# Patient Record
Sex: MALE | Race: WHITE | NOT HISPANIC OR LATINO | URBAN - METROPOLITAN AREA
[De-identification: names, ages, dates, MRNs, and addresses within clinical notes are randomized per-mention and may not be internally consistent; named-entity substitution may affect disease eponyms.]

---

## 2017-12-16 ENCOUNTER — EMERGENCY (EMERGENCY)
Facility: HOSPITAL | Age: 62
LOS: 1 days | Discharge: HOME | End: 2017-12-16
Attending: EMERGENCY MEDICINE | Admitting: EMERGENCY MEDICINE
Payer: COMMERCIAL

## 2017-12-16 VITALS
HEIGHT: 72 IN | SYSTOLIC BLOOD PRESSURE: 137 MMHG | HEART RATE: 85 BPM | TEMPERATURE: 98 F | RESPIRATION RATE: 16 BRPM | OXYGEN SATURATION: 98 % | DIASTOLIC BLOOD PRESSURE: 99 MMHG | WEIGHT: 175.05 LBS

## 2017-12-16 PROCEDURE — 99283 EMERGENCY DEPT VISIT LOW MDM: CPT | Mod: 25

## 2017-12-16 PROCEDURE — 90715 TDAP VACCINE 7 YRS/> IM: CPT

## 2017-12-16 PROCEDURE — 99284 EMERGENCY DEPT VISIT MOD MDM: CPT

## 2017-12-16 PROCEDURE — 90471 IMMUNIZATION ADMIN: CPT

## 2017-12-16 RX ORDER — RALTEGRAVIR 400 MG/1
0 TABLET, FILM COATED ORAL
Qty: 0 | Refills: 0 | COMMUNITY

## 2017-12-16 RX ORDER — TETANUS TOXOID, REDUCED DIPHTHERIA TOXOID AND ACELLULAR PERTUSSIS VACCINE, ADSORBED 5; 2.5; 8; 8; 2.5 [IU]/.5ML; [IU]/.5ML; UG/.5ML; UG/.5ML; UG/.5ML
0.5 SUSPENSION INTRAMUSCULAR ONCE
Qty: 0 | Refills: 0 | Status: COMPLETED | OUTPATIENT
Start: 2017-12-16 | End: 2017-12-16

## 2017-12-16 RX ORDER — IBUPROFEN 200 MG
400 TABLET ORAL ONCE
Qty: 0 | Refills: 0 | Status: DISCONTINUED | OUTPATIENT
Start: 2017-12-16 | End: 2017-12-16

## 2017-12-16 RX ORDER — HUMAN RABIES VIRUS IMMUNE GLOBULIN 150 [IU]/ML
1600 INJECTION, SOLUTION INTRAMUSCULAR ONCE
Qty: 0 | Refills: 0 | Status: DISCONTINUED | OUTPATIENT
Start: 2017-12-16 | End: 2017-12-20

## 2017-12-16 RX ORDER — IBUPROFEN 200 MG
600 TABLET ORAL ONCE
Qty: 0 | Refills: 0 | Status: COMPLETED | OUTPATIENT
Start: 2017-12-16 | End: 2017-12-16

## 2017-12-16 RX ORDER — EMTRICITABINE AND TENOFOVIR DISOPROXIL FUMARATE 200; 300 MG/1; MG/1
0 TABLET, FILM COATED ORAL
Qty: 0 | Refills: 0 | COMMUNITY

## 2017-12-16 RX ORDER — RABIES VACC, HUMAN DIPLOID/PF 2.5 UNIT
1 VIAL (EA) INTRAMUSCULAR ONCE
Qty: 0 | Refills: 0 | Status: DISCONTINUED | OUTPATIENT
Start: 2017-12-16 | End: 2017-12-20

## 2017-12-16 RX ORDER — ETRAVIRINE 200 MG/1
0 TABLET ORAL
Qty: 0 | Refills: 0 | COMMUNITY

## 2017-12-16 RX ADMIN — Medication 600 MILLIGRAM(S): at 14:40

## 2017-12-16 RX ADMIN — TETANUS TOXOID, REDUCED DIPHTHERIA TOXOID AND ACELLULAR PERTUSSIS VACCINE, ADSORBED 0.5 MILLILITER(S): 5; 2.5; 8; 8; 2.5 SUSPENSION INTRAMUSCULAR at 14:38

## 2017-12-16 RX ADMIN — Medication 1 TABLET(S): at 14:39

## 2017-12-16 NOTE — ED PROVIDER NOTE - OBJECTIVE STATEMENT
63 yo M w/ pmh HIV (last CD4 600, well managed w/ meds) p/w dog bite to legs. 12:45pm today pt was walking outside, saw neighbor's dog (believes usu is aggressive, recent rescue? unk vaccination hx) straining against the owner's leash, got loose and bit him on his legs, once on left low posterior thigh, latched onto right upper posterior calf. Reports mild pain in bite sites 3/10, did not take pain meds, has not taken antibiotics. Ambulating w/out difficulty, denies pain elsewhere other than two bite sites; denies f/n/v, sob, myalgias. Pt has been unable to contact the owner of the dog, contacted apartment superintendent to get in contact w/ owner of dog but was unsuccessful.

## 2017-12-16 NOTE — ED PROVIDER NOTE - ATTENDING CONTRIBUTION TO CARE
61yo M with h/o HIV (last CD4 600+, last viral load undetectable), presenting with dog bite to both legs -   is from known dog with + vaccinations for rabies and able to be observed (no indication for rabies vaccination/ig).  Reported to [facility] JAYLAN given location of incident.   No laceration repairs needed.  Wound care/wash out performed and augmentin given for infection prophylaxis.  Stable for dc.

## 2017-12-16 NOTE — ED PROVIDER NOTE - PROGRESS NOTE DETAILS
Dr. Tate Allison PGY1: Dr. Samaniego [facility], awaiting callback w/ rec for rabies prophylaxis or not Dr. Tate Allison PGY1: pt reports received call back from the owner of the dog who bit him, "Darya", I spoke w/ her and she said she got it from a shelter in the city, holding it while waiting for family to take; reports dog is fully vaccinated and up to date and will send the patient a picture of the records Dr. Tate Allison PGY1: pt reports received call back from the owner of the dog who bit him, "Darya", I spoke w/ her and she said she got it from a shelter in the city, holding it while waiting for family to take; reports dog is fully vaccinated and up to date and will send the patient a picture of the records; owner of the dog says that she was not present during the incident and thinks that a family member may have been walking the dog Spoke with Dr Pickett ([facility] as bite occurred in [place]), dog is vaccinated per owner, known dog in same apartment building; will defer rabies ig/vaccination at present.  Decision making and risks/benefits of rabies vaccination d/w patient; based on available information and shared decision making will defer any vaccination at present.  Patient stable for dc on augmentin, to f/u with [facility] and his PCP.   -Aden

## 2017-12-16 NOTE — ED PROVIDER NOTE - MEDICAL DECISION MAKING DETAILS
63 yo M w/ unprovoked dog bites, unable to get in contact w/ dog owner; no constitutional findings, no neurovasculomucuslar findings, not indicated for lac repair, provide augmentin; also rabies IVIG and vaccine pending dept of health callback 61 yo M w/ unprovoked dog bites, unable to get in contact w/ dog owner; no constitutional findings, no neurovasculomucuslar findings, not indicated for lac repair, provide augmentin; also rabies IVIG and vaccine pending dept of health callback    -Addendum: 61 yo M with h/o HIV+ presenting with b/l LE dog bites; now is from known dog with + vaccinations for rabies and able to be observed.  No laceration repairs needed.  Wound care/wash out performed and augmentin given for infection prophylaxis.  Stable for dc.

## 2017-12-16 NOTE — ED ADULT NURSE REASSESSMENT NOTE - NS ED NURSE REASSESS COMMENT FT1
owner of dog contacted patient. immunizations are complete . dog is neg for rabies. pt ckeared for discharge.. b/l thigh wounds cleansed bacitracin ointment applied with DSD

## 2017-12-16 NOTE — ED PROVIDER NOTE - PHYSICAL EXAMINATION
*GEN:   comfortable, in no acute distress, AOx3    ///    *EYES:   pupils equally round and reactive to light, extra-occular movements intact    ///    *HEENT:   airway patent, moist mucosal membranes    ///    *CV:   regular rate and rhythm, normal S1/S2    ///    *RESP:   clear to auscultation bilaterally, non-labored    ///    *ABD:   soft, non-tender    ///    *:   no cva/flank tenderness    ///    *MSK:   no MSK tenderness or limited ROM    ///    *SKIN:   L distal posterior thigh 2x2cm area of non-bleeding skin abrasions, R proximal posterior calf 4x4cm area of non-bleeding skin abrasions    ///    *NEURO:   AOx3, no focal weakness or loss of sensation, gait normal L distal posterior thigh 2x2cm area of non-bleeding skin abrasions in semi [place] (bite maryanne), R proximal posterior calf 4x4cm area of non-bleeding skin abrasions

## 2017-12-16 NOTE — ED ADULT NURSE NOTE - OBJECTIVE STATEMENT
pt ambulatory to ft 1 c/o dog bite to back of both legs. dog was on leash . unknown whether dog is utd with immunizations. dog is rescue and owners are uncooperative. pt ambulatory to ft 1 c/o dog bite to back of both legs.dog broke away from leash and owner. and bit pt on posterior thigh on both legss. pt unable to contactowner about immunizations , [facility] dept of health contactted. superintendent unable to contact dog owner.